# Patient Record
Sex: FEMALE | Race: WHITE | NOT HISPANIC OR LATINO | URBAN - METROPOLITAN AREA
[De-identification: names, ages, dates, MRNs, and addresses within clinical notes are randomized per-mention and may not be internally consistent; named-entity substitution may affect disease eponyms.]

---

## 2017-10-05 NOTE — PATIENT DISCUSSION
(T54.664) Vitreous degeneration, bilateral - Assesment : Examination revealed PVD - Plan : Monitor for changes. Advised patient to call our office with decreased vision or an increase in flashes and/or floaters.

## 2017-10-05 NOTE — PATIENT DISCUSSION
(E11.9) Type 2 diabetes mellitus without complications - Assesment : Examination reveals Type 2 Diabetes mellitus without ocular complications. - Plan : Continue following with PCP for routine care. Stressed importance of keeping A1c levels below 6.5 and monitoring blood sugar. Letter explaining today's findings faxed to patient's PCP. RV 1 Year for Complete Exam, or sooner if having problems or changes in vision.

## 2017-10-05 NOTE — PATIENT DISCUSSION
(H25.13) Age-related nuclear cataract, bilateral - Assesment : Examination revealed cataract. Diagnoses discussed with patient in depth. Advised patient cataracts will cause change in glasses RX - Plan : Monitor for changes. Advised patient to call our office with decreased vision or increased symptoms.  Final Rx for glasses given to patient 6-12 months  Exam/Octm

## 2017-10-05 NOTE — PATIENT DISCUSSION
(H35.372) Puckering of macula, left eye - Assesment : Examination revealed ERM with progression seen on oct today since last visit Diagnosed discussed in depth with patient - Plan : Monitor. Advised patient to call our office with decreased vision or increased symptoms.

## 2018-04-02 NOTE — PATIENT DISCUSSION
(E11.9) Type 2 diabetes mellitus without complications - Assesment : Examination reveals Type 2 Diabetes mellitus without ocular complications. No hemorrhages or signs of macular edema seen in either eye on examination today. - Plan : Continue following with PCP for routine care. Stressed importance of keeping A1c levels below 6.5 and monitoring blood sugar. Letter explaining today's findings faxed to patient's PCP. RV 1 Year for Complete Exam, or sooner if having problems or changes in vision.

## 2018-04-02 NOTE — PATIENT DISCUSSION
(H35.372) Puckering of macula, left eye - Assesment : Examination revealed ERM. - Plan : Monitor. Advised patient to call our office with decreased vision or increased symptoms.

## 2019-06-21 NOTE — PATIENT DISCUSSION
(P52.564) Other benign neoplasm skin/ left lower eyelid, inc canthus - Assesment : Examination revealed benign neoplasm of the lids.   [facility] - Plan : OBSERVATION

## 2019-06-21 NOTE — PATIENT DISCUSSION
(F99.357) Vitreous degeneration, bilateral - Assesment : Examination revealed PVD - Plan : Monitor for changes. Advised patient to call our office with decreased vision or an increase in flashes and/or floaters.

## 2019-06-21 NOTE — PATIENT DISCUSSION
(H35.373) Puckering of macula, bilateral - Assesment : Examination revealed ERM. OS>OD. - Plan : Monitor. Advised patient to call our office with decreased vision or increased symptoms.

## 2019-06-21 NOTE — PATIENT DISCUSSION
(H25.13) Age-related nuclear cataract, bilateral - Assesment : Examination revealed cataract. ADVISED PATIENT HE HAS CATARACTS IN BOTH EYES. ADVISED PATIENT HE MAY PROCEED WITH CATARACT SURGERY TO MAXIMIZE THE VISION IN BOTH EYES. - Plan : Monitor for changes. Advised patient to call our office with decreased vision or increased symptoms.

## 2019-06-21 NOTE — PATIENT DISCUSSION
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 No macular edema today ou. - Plan : FOLLOW UP WITH PCP FOR DIABETIC MANAGEMENT.  KEEP A1C LEVEL BELOW 6.5.  1 YEAR EXAM/ MAC PHOTOS

## 2022-01-25 ENCOUNTER — PREPPED CHART (OUTPATIENT)
Dept: URBAN - METROPOLITAN AREA CLINIC 7 | Facility: CLINIC | Age: 37
End: 2022-01-25

## 2022-01-25 ENCOUNTER — ESTABLISHED PATIENT (OUTPATIENT)
Dept: URBAN - METROPOLITAN AREA CLINIC 7 | Facility: CLINIC | Age: 37
End: 2022-01-25

## 2022-01-25 DIAGNOSIS — H52.13: ICD-10-CM

## 2022-01-25 PROCEDURE — 92310C CONTACT LENS 75

## 2022-01-25 PROCEDURE — 92015 DETERMINE REFRACTIVE STATE: CPT

## 2022-01-25 PROCEDURE — 92014 COMPRE OPH EXAM EST PT 1/>: CPT

## 2022-01-25 ASSESSMENT — KERATOMETRY
OD_AXISANGLE2_DEGREES: 62
OS_AXISANGLE_DEGREES: 180
OD_K2POWER_DIOPTERS: 45.00
OS_K1POWER_DIOPTERS: 45.00
OS_AXISANGLE2_DEGREES: 90
OD_K1POWER_DIOPTERS: 44.50
OS_K2POWER_DIOPTERS: 45.00
OD_AXISANGLE_DEGREES: 152

## 2022-01-25 ASSESSMENT — TONOMETRY
OD_IOP_MMHG: 14
OS_IOP_MMHG: 14

## 2024-10-02 ENCOUNTER — COMPREHENSIVE EXAM (OUTPATIENT)
Dept: URBAN - METROPOLITAN AREA CLINIC 10 | Facility: CLINIC | Age: 39
End: 2024-10-02

## 2024-10-02 DIAGNOSIS — D31.32: ICD-10-CM

## 2024-10-02 DIAGNOSIS — H52.13: ICD-10-CM

## 2024-10-02 PROCEDURE — 92250 FUNDUS PHOTOGRAPHY W/I&R: CPT

## 2024-10-02 PROCEDURE — 92015 DETERMINE REFRACTIVE STATE: CPT

## 2024-10-02 PROCEDURE — 92014 COMPRE OPH EXAM EST PT 1/>: CPT

## 2024-10-02 PROCEDURE — 92310C CONTACT LENS 75
